# Patient Record
Sex: FEMALE
[De-identification: names, ages, dates, MRNs, and addresses within clinical notes are randomized per-mention and may not be internally consistent; named-entity substitution may affect disease eponyms.]

---

## 2017-02-22 ENCOUNTER — TRANSCRIPTION ENCOUNTER (OUTPATIENT)
Age: 57
End: 2017-02-22

## 2021-03-21 ENCOUNTER — TRANSCRIPTION ENCOUNTER (OUTPATIENT)
Age: 61
End: 2021-03-21

## 2021-06-02 PROBLEM — Z00.00 ENCOUNTER FOR PREVENTIVE HEALTH EXAMINATION: Status: ACTIVE | Noted: 2021-06-02

## 2021-06-30 ENCOUNTER — APPOINTMENT (OUTPATIENT)
Dept: ORTHOPEDIC SURGERY | Facility: CLINIC | Age: 61
End: 2021-06-30
Payer: COMMERCIAL

## 2021-06-30 VITALS
HEART RATE: 87 BPM | SYSTOLIC BLOOD PRESSURE: 137 MMHG | HEIGHT: 64 IN | BODY MASS INDEX: 35 KG/M2 | DIASTOLIC BLOOD PRESSURE: 78 MMHG | WEIGHT: 205 LBS

## 2021-06-30 DIAGNOSIS — Z78.9 OTHER SPECIFIED HEALTH STATUS: ICD-10-CM

## 2021-06-30 DIAGNOSIS — Z80.9 FAMILY HISTORY OF MALIGNANT NEOPLASM, UNSPECIFIED: ICD-10-CM

## 2021-06-30 DIAGNOSIS — Z82.61 FAMILY HISTORY OF ARTHRITIS: ICD-10-CM

## 2021-06-30 DIAGNOSIS — Z87.39 PERSONAL HISTORY OF OTHER DISEASES OF THE MUSCULOSKELETAL SYSTEM AND CONNECTIVE TISSUE: ICD-10-CM

## 2021-06-30 DIAGNOSIS — Z86.79 PERSONAL HISTORY OF OTHER DISEASES OF THE CIRCULATORY SYSTEM: ICD-10-CM

## 2021-06-30 PROCEDURE — 99204 OFFICE O/P NEW MOD 45 MIN: CPT | Mod: 25

## 2021-06-30 PROCEDURE — 20612 ASPIRATE/INJ GANGLION CYST: CPT | Mod: LT

## 2021-06-30 RX ORDER — CHOLECALCIFEROL (VITAMIN D3) 25 MCG
TABLET ORAL
Refills: 0 | Status: ACTIVE | COMMUNITY

## 2021-06-30 RX ORDER — PANTOPRAZOLE SODIUM 40 MG/10ML
40 INJECTION, POWDER, FOR SOLUTION INTRAVENOUS
Refills: 0 | Status: ACTIVE | COMMUNITY

## 2021-06-30 RX ORDER — OLMESARTAN MEDOXOMIL AND HYDROCHLOROTHIAZIDE 20; 12.5 MG/1; MG/1
20-12.5 TABLET ORAL
Refills: 0 | Status: ACTIVE | COMMUNITY

## 2021-06-30 NOTE — PHYSICAL EXAM
[de-identified] : - Constitutional: This is a female in no obvious distress.  \par - Psych: Patient is alert and oriented to person, place and time.  Patient has a normal mood and affect.\par - Cardiovascular: Normal pulses throughout the upper extremities.  No significant varicosities are noted in the upper extremities. \par - Neuro: Strength and sensation are intact throughout the upper extremities.  Patient has normal coordination.\par - Respiratory:  Patient exhibits no evidence of shortness of breath or difficulty breathing.\par - Skin: No rashes, lesions, or other abnormalities are noted in the upper extremities.\par \par --- \par \par Examination of her left hand demonstrates a freely mobile mass along the dorsal aspect of the hand between the webspace between the fourth and fifth metacarpal heads.  It is freely mobile.  It is slightly tender.  She has full motion.  She is neurovascularly intact distally.

## 2021-06-30 NOTE — END OF VISIT
[FreeTextEntry3] : This note was written by Odessa Miles on 06/30/2021 acting solely as a scribe for Dr. Thien Saeed.\par  \par All medical record entries made by the Scribe were at my, Dr. Thien Saeed, direction and personally dictated by me on 06/30/2021. I have personally reviewed the chart and agree that the record accurately reflects my personal performance of the history, physical exam, assessment and plan.

## 2021-06-30 NOTE — ADDENDUM
[FreeTextEntry1] : I, Odessa Miles, acted solely as a scribe for Dr. Saeed on this date on 06/30/2021.

## 2021-06-30 NOTE — DISCUSSION/SUMMARY
[FreeTextEntry1] : She has findings consistent with a benign appearing left hand mass along the dorsal aspect of the hand between the interspace between the fourth and fifth metacarpal heads.  Aspiration yielded no fluid.  This likely represents either a fibroma, schwannoma, giant cell tumor of tendon sheath or other benign etiologies.\par \par I had a discussion with the patient regarding today's visit, the prognosis of this diagnosis, and treatment recommendations and options.  At this time, she has agreed to schedule surgical excision.  She told me that she would like to wait until after the summer.  I told her that as long as it is not bothering her and is not increasing in size, then there is no concern to delay the procedure.\par \par -  The nature and purposes of the operation/procedure was discussed in detail.  I discussed the surgical procedure in detail, as well as expected postoperative recovery and outcome.\par -  Possible risks, benefits, and complications (from known and unknown causes) of the procedure were discussed in detail.  \par -  Possible non-operative alternatives to the proposed treatment were discussed in detail.  \par -  She was told that possible risks/complications include, but are not limited to:  Infection, sensory nerve or vessel injury, stiffness, painful scar, poor outcome, need for additional surgical procedures, and other unforeseen complications.  \par -  In addition, the possibility of an "unsuccessful outcome," despite "successful surgery," was discussed with her.  Specifically, I discussed the potential chance of recurrence, depending upon the pathology.  However, this appears well-circumscribed, and this is unlikely.\par -  The patient fully understands these risks and wishes to proceed.  \par -  I had a lengthy discussion with the patient regarding today's visit, the diagnosis, and my surgical treatment recommendations.  The patient has agreed to this plan of management and has expressed full understanding.  All questions were fully answered to the patient's satisfaction. 	 \par \par My cumulative time spent on this visit included: Preparation for the visit, review of the medical records, review of pertinent diagnostic studies, examination and counseling of the patient on the above diagnosis, treatment plan and prognosis, orders of diagnostic tests, medication and/or appropriate procedures and documentation in the medical records of today's visit.

## 2021-06-30 NOTE — HISTORY OF PRESENT ILLNESS
[FreeTextEntry1] : She comes in today for evaluation of a left nodule/mass over the MCP joint of the ring finger. She notes it fluctuates in size. She has pain if she hits it against a surface. She is not experiencing any limitations with range of motion. She rates her pain as a 5 out of 10.\par \par Of note, she is status post knee replacement July 2020 by Dr. Blake Enriquez

## 2021-06-30 NOTE — PROCEDURE
[FreeTextEntry1] : Using sterile technique, an attempted aspiration of the growth demonstrated no obvious fluid.

## 2023-10-08 ENCOUNTER — NON-APPOINTMENT (OUTPATIENT)
Age: 63
End: 2023-10-08

## 2023-10-11 PROBLEM — D21.12 BENIGN NEOPLASM OF SOFT TISSUES OF UPPER LIMB, LEFT: Status: ACTIVE | Noted: 2021-06-30

## 2023-10-18 ENCOUNTER — APPOINTMENT (OUTPATIENT)
Dept: ORTHOPEDIC SURGERY | Facility: CLINIC | Age: 63
End: 2023-10-18

## 2023-10-18 DIAGNOSIS — D21.12 BENIGN NEOPLASM OF CONNECTIVE AND OTHER SOFT TISSUE OF LEFT UPPER LIMB, INCLUDING SHOULDER: ICD-10-CM
